# Patient Record
Sex: FEMALE | Race: OTHER
[De-identification: names, ages, dates, MRNs, and addresses within clinical notes are randomized per-mention and may not be internally consistent; named-entity substitution may affect disease eponyms.]

---

## 2019-08-03 ENCOUNTER — HOSPITAL ENCOUNTER (EMERGENCY)
Dept: HOSPITAL 72 - EMR | Age: 69
Discharge: HOME | End: 2019-08-03
Payer: MEDICARE

## 2019-08-03 VITALS — BODY MASS INDEX: 31.89 KG/M2 | WEIGHT: 180 LBS | HEIGHT: 63 IN

## 2019-08-03 VITALS — DIASTOLIC BLOOD PRESSURE: 96 MMHG | SYSTOLIC BLOOD PRESSURE: 189 MMHG

## 2019-08-03 VITALS — DIASTOLIC BLOOD PRESSURE: 71 MMHG | SYSTOLIC BLOOD PRESSURE: 130 MMHG

## 2019-08-03 DIAGNOSIS — L03.115: ICD-10-CM
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
## 2019-08-03 DIAGNOSIS — Z23: ICD-10-CM

## 2019-08-03 DIAGNOSIS — Z88.8: ICD-10-CM

## 2019-08-03 DIAGNOSIS — S81.851A: Primary | ICD-10-CM
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
## 2019-08-03 PROCEDURE — 90471 IMMUNIZATION ADMIN: CPT

## 2019-08-03 PROCEDURE — 90715 TDAP VACCINE 7 YRS/> IM: CPT

## 2019-08-03 PROCEDURE — 99283 EMERGENCY DEPT VISIT LOW MDM: CPT

## 2019-08-03 NOTE — EMERGENCY ROOM REPORT
History of Present Illness


General


Chief Complaint:  Skin Rash/Abscess


Source:  Patient





Present Illness


HPI


69-year-old female with no significant past medical history here complaining of 

pain and swelling right callus after being bit by her cat 2 days ago.  Patient 

is rating the pain 5 out of 10 without radiation denying tingling numbness.  

Denies fever and chills and pus drainage from the site.  Denies chest pain, 

shortness of breath, nausea vomiting, abdominal pain, palpitation.  Patient is 

not up-to-date with her tetanus shot.  The site of bite is infected and warm to 

touch however superficial.  No sensory or motor deficits noted.  Patient has 

not taken medication for symptom relief.


Allergies:  


Coded Allergies:  


     CEPHALEXIN (Verified  Allergy, Unknown, Rash, 8/3/19)





Patient History


Past Medical History:  see triage record


Past Surgical History:  unable to obtain


Pertinent Family History:  unable to obtain


Pregnant Now:  No


Immunizations:  UTD - tdap today


Reviewed Nursing Documentation:  PMH: Agreed; PSxH: Agreed





Nursing Documentation-PMH


Past Medical History:  No Stated History





Review of Systems


All Other Systems:  negative except mentioned in HPI





Physical Exam





Vital Signs








  Date Time  Temp Pulse Resp B/P (MAP) Pulse Ox O2 Delivery O2 Flow Rate FiO2


 


8/3/19 18:44 98.8 81 25 189/96 (127) 95 Room Air  








Sp02 EP Interpretation:  reviewed, normal


General Appearance:  normal inspection, well appearing, no apparent distress, 

alert, GCS 15


Head:  normocephalic, atraumatic


Eyes:  bilateral eye normal inspection, bilateral eye PERRL


ENT:  normal ENT inspection, hearing grossly normal, normal pharynx


Neck:  normal inspection, full range of motion, supple, thyroid normal


Respiratory:  normal inspection, chest non-tender, lungs clear, normal breath 

sounds, no rhonchi, no wheezing


Cardiovascular #1:  normal inspection, regular rate, rhythm, no murmur


Cardiovascular #2:  2+ dorsalis pedis (R), 2+ dorsalis pedis (L)


Gastrointestinal:  normal inspection, non tender, soft, no mass, no organomegaly

, no guarding


Genitourinary:  no CVA tenderness


Musculoskeletal:  normal inspection, back normal, no calf tenderness


Neurologic:  normal inspection, alert, oriented x3, responsive


Psychiatric:  normal inspection, judgement/insight normal


Skin:  other - Cellulitis of right callus secondary to puncture wound caused by 

cat bite


Lymphatic:  normal inspection, no adenopathy





Medical Decision Making


PA Attestation


All diagnoses and treatment plans were reviewed and discussed with my 

supervising physician Dr. Carrizales


Diagnostic Impression:  


 Primary Impression:  


 Cat bite of right lower leg


 Additional Impression:  


 Cellulitis of right lower leg


ER Course


69-year-old female with no significant past medical history here complaining of 

pain and swelling right callus after being bit by her cat 2 days ago.  Patient 

is rating the pain 5 out of 10 without radiation denying tingling numbness.  

Denies fever and chills and pus drainage from the site.  Denies chest pain, 

shortness of breath, nausea vomiting, abdominal pain, palpitation.  Patient is 

not up-to-date with her tetanus shot.  The site of bite is infected and warm to 

touch however superficial.  No sensory or motor deficits noted.  Patient has 

not taken medication for symptom relief.





Ddx considered but are not limited to : Cellulitis, DVT, superficial infection, 

abscess





Vital signs: are WNL, pt. is afebrile





H&PE are most consistent with: Cellulitis of right lower leg secondary to cat 

bite





ORDERS: Right tib-fib x-ray, Tdap, clindamycin, ibuprofen


ED INTERVENTIONS: Tdap, wound clean and dress





DISCHARGE: At this time pt. is stable for d/c to home. Will provide printed 

patient care instructions, and any necessary prescriptions. Care plan and 

follow up instructions have been discussed with the patient prior to discharge.

  Advised the patient to take medication for eventual emergency room also follow

-up with her primary care provider


Other X-Ray Diagnostic Results


Other X-Ray Diagnostic Results :  


   X-Ray ordered:  R tib fib


   # of Views/Limited Vs Complete:  2 View


   Indication:  Pain


   EP Interpretation:  Yes


   PA Xray:  Interpretation reviewed, by supervising MD, and agrees with 

findings.


   Interpretation:  no dislocation, no soft tissue swelling, no fractures, 

other - no fb


   Impression:  No acute disease


   Electronically Signed by:  Lucille Solomon PA-C





Last Vital Signs








  Date Time  Temp Pulse Resp B/P (MAP) Pulse Ox O2 Delivery O2 Flow Rate FiO2


 


8/3/19 18:54 98.8 82 25 189/96 95 Room Air  








Disposition:  HOME, SELF-CARE


Condition:  Stable


Scripts


Ibuprofen* (MOTRIN*) 600 Mg Tablet


600 MG ORAL Q8H PRN for For Pain, #30 TAB 0 Refills


   Prov: Lucille Bearden         8/3/19 


Clindamycin Hcl (CLINDAMYCIN HCL) 300 Mg Capsule


300 MG ORAL FOUR TIMES A DAY for 7 Days, #28 CAP


   Prov: Lucille Bearden         8/3/19


Patient Instructions:  Animal Bite, Easy-to-Read, Cellulitis, Easy-to-Read





Additional Instructions:  


Take medication as directed follow-up with a primary care provider and return 

to the emergency room.











Lucille Bearden Aug 3, 2019 19:14

## 2019-08-03 NOTE — NUR
HAND-OFF: 

Report given to MARK Wolff. no orders to carry at this moment. [facility] rabies control program faxed by Samuel Carmona RN

## 2019-08-03 NOTE — NUR
ED Nurse Note:



Patient ambulated to ER from home due to animal bite. her cat bit her on Rt 
posterior calf 2 days ago. no bleeding or drainage but swelling and redness and 
scab present at this time. pt aao x4 and ambulatory. calm and cooperative.

## 2019-08-03 NOTE — DIAGNOSTIC IMAGING REPORT
EXAM:

  XR Right Tibia and Fibula, 2 Views

 

CLINICAL HISTORY:

  FB

 

TECHNIQUE:

  Frontal and lateral views of the right tibia and fibula.

 

COMPARISON:

  No relevant prior studies available.

 

FINDINGS:

  Bones/joints:  Unremarkable.  No acute fracture.  No dislocation.

  Soft tissues: Suspect mild Diffuse soft tissue swelling in distal lower 

leg.  No radiopaque foreign body.

 

IMPRESSION:     

No radiopaque foreign body.